# Patient Record
Sex: FEMALE | Race: WHITE
[De-identification: names, ages, dates, MRNs, and addresses within clinical notes are randomized per-mention and may not be internally consistent; named-entity substitution may affect disease eponyms.]

---

## 2018-01-01 ENCOUNTER — HOSPITAL ENCOUNTER (EMERGENCY)
Dept: HOSPITAL 17 - NEPK | Age: 28
Discharge: HOME | End: 2018-01-01
Payer: COMMERCIAL

## 2018-01-01 VITALS
SYSTOLIC BLOOD PRESSURE: 149 MMHG | HEART RATE: 97 BPM | OXYGEN SATURATION: 100 % | TEMPERATURE: 98.7 F | DIASTOLIC BLOOD PRESSURE: 94 MMHG | RESPIRATION RATE: 16 BRPM

## 2018-01-01 VITALS — WEIGHT: 220.46 LBS | BODY MASS INDEX: 34.6 KG/M2 | HEIGHT: 67 IN
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
## 2018-01-01 DIAGNOSIS — S93.401A: Primary | ICD-10-CM

## 2018-01-01 PROCEDURE — 29515 APPLICATION SHORT LEG SPLINT: CPT

## 2018-01-01 PROCEDURE — 99283 EMERGENCY DEPT VISIT LOW MDM: CPT

## 2018-01-01 PROCEDURE — E0113 CRUTCH UNDERARM EACH WOOD: HCPCS

## 2018-01-01 PROCEDURE — L1906 AFO MULTILIG ANK SUP PRE OTS: HCPCS

## 2018-01-01 PROCEDURE — 73610 X-RAY EXAM OF ANKLE: CPT

## 2018-01-01 NOTE — PD
HPI


Chief Complaint:  Injury


Time Seen by Provider:  16:13


Travel History


International Travel<30 days:  No


Contact w/Intl Traveler<30days:  No


Traveled to known affect area:  No





History of Present Illness


HPI


27-year-old  female brought in by EMS status post slip and fall with 

injury to the right lateral ankle.  Patient was coming out of the local book 

store when she slipped and fell [place] injuring her right lateral ankle.  Pain 

is currently 6 out of 10.  Patient denies numbness or tingling distally.  She 

denies foot pain on the right.  She denies knee or shin pain.  There are no 

open wounds or abrasions.  Patient denies any other injury.  She is unable to 

bear weight on the right foot.  She has no known drug allergies.





PFSH


Past Medical History


Pregnant?:  Not Pregnant


LMP:  12/12/17





Social History


Alcohol Use:  Yes


Tobacco Use:  No


Substance Use:  No





Allergies-Medications


(Allergen,Severity, Reaction):  


Coded Allergies:  


     No Known Allergies (Verified  Allergy, Unknown, 7/21/04)


Reported Meds & Prescriptions





Reported Meds & Active Scripts


Active


Mapap Extra Strength (Acetaminophen) 500 Mg Tab 1,000 Mg PO Q4-6H PRN


Ibuprofen 600 Mg Tab 600 Mg PO Q6H PRN








Review of Systems


Except as stated in HPI:  all other systems reviewed are Neg


General / Constitutional:  No: Fever


Eyes:  No: Visual changes


HENT:  No: Headaches


Cardiovascular:  No: Chest Pain or Discomfort


Respiratory:  No: Shortness of Breath


Gastrointestinal:  No: Abdominal Pain


Genitourinary:  No: Dysuria


Musculoskeletal:  Positive: Arthralgias, Limited ROM, Pain


Skin:  No Rash


Neurologic:  No: Weakness


Psychiatric:  No: Depression


Endocrine:  No: Polydipsia


Hematologic/Lymphatic:  No: Easy Bruising





Physical Exam


Narrative


GENERAL: Patient appears in mild to moderate distress.


SKIN: Warm and dry.  Normal color.  Normal turgor.  No ecchymosis.  No open 

wounds or abrasions.


HEAD: Atraumatic. Normocephalic. 


EYES: Pupils equal and round. No scleral icterus. No injection or drainage. 


ENT: No nasal bleeding or discharge.  Mucous membranes pink and moist.  Pharynx 

is clear.  Airway is patent.


NECK: Trachea midline.  Supple and nontender.


CARDIOVASCULAR: Regular rate and rhythm.  


RESPIRATORY: No accessory muscle use. Clear to auscultation. Breath sounds 

equal bilaterally. 


MUSCULOSKELETAL: Extremities without clubbing, cyanosis, or edema. No obvious 

deformities.  Patient has pain swelling over the right lateral malleolus.  No 

palpable crepitus.  There is no pain with palpation of the right foot.  There 

is no pain with palpation of the proximal right fibula.


NEUROLOGICAL: Awake and alert. No obvious cranial nerve deficits.  Motor 

grossly within normal limits. Five out of 5 muscle strength in the arms and 

legs.  Normal speech.


PSYCHIATRIC: Appropriate mood and affect; insight and judgment normal.





Data


Data


Last Documented VS





Vital Signs








  Date Time  Temp Pulse Resp B/P (MAP) Pulse Ox O2 Delivery O2 Flow Rate FiO2


 


1/1/18 16:03 98.7 97 16 149/94 (112) 100   








Orders





 Orders


Ankle, Complete ([id number]) (1/1/18 16:14)


Ice/Cold Pack (1/1/18 16:14)


Splint Or Brace Apply/Monitor (1/1/18 16:46)


Crutches (1/1/18 16:46)








MDM


Medical Decision Making


Medical Screen Exam Complete:  Yes


Emergency Medical Condition:  Yes


Differential Diagnosis


Slip and fall.  Right ankle sprain.  Right ankle fracture.


Narrative Course


Ice pack is applied to the injured area.


X-rays ordered of the right ankle.


Patient did not request pain medication at this time.


X-ray shows no obvious fracture or dislocation.


Patient is placed in an air cast or splint and crutches.


Patient is given ibuprofen 600 mg 4 times a day #40.


Patient is given Mapap 500 mg 2 tabs every 6 hours when necessary.  #80.


Patient is to use ice and bear weight as tolerated over the next several weeks.


Patient should follow up if symptoms are not improving in the next week to 10 

days.





Diagnosis





 Primary Impression:  


 Moderate right ankle sprain


 Qualified Codes:  S93.401A - Sprain of unspecified ligament of right ankle, 

initial encounter


Referrals:  


Primary Care Physician


Patient Instructions:  Ankle Sprain (ED), Ankle Sprain Exercises (GEN), Ankle 

Stirrup Splint (ED), Crutch Instructions (ED), General Instructions


Departure Forms:  Work Release   


   Special Instructions:  Patient is to use ankle splint and crutches until 

symptoms resolve and the


      right ankle, or until cleared by primary care physician.





***Additional Instructions:  


X-ray shows no obvious fracture or dislocation.


Patient is placed in an air cast or splint and crutches.


Patient is given ibuprofen 600 mg 4 times a day #40.


Patient is given Mapap 500 mg 2 tabs every 6 hours when necessary.  #80.


Patient is to use ice and bear weight as tolerated over the next several weeks.


Patient should follow up if symptoms are not improving in the next week to 10 

days.


***Med/Other Pt SpecificInfo:  Prescription(s) given


Scripts


Acetaminophen (Mapap Extra Strength) 500 Mg Tab


1000 MG PO Q4-6H Y for PAIN, #80 TAB 0 Refills


   Prov: Oz Perkins MD         1/1/18 


Ibuprofen (Ibuprofen) 600 Mg Tab


600 MG PO Q6H Y for Pain/Inflammation, #40 TAB 0 Refills


   Prov: Oz Perkins MD         1/1/18


Disposition:  01 DISCHARGE HOME


Condition:  Stable











Catarino Ocasio Jan 1, 2018 16:20

## 2018-01-01 NOTE — RADRPT
EXAM DATE/TIME:  01/01/2018 16:27 

 

[facility] COMPARISON:     

No previous studies available for comparison.

 

                     

INDICATIONS :     Patient rolled ankle laterally. Complains of pain and swelling

                     

MEDICAL HISTORY :     None.          

SURGICAL HISTORY :     None.   

ENCOUNTER:     Initial                                        

ACUITY:     1 day      

PAIN SCORE:     9/10

LOCATION:     Right lateral ankle

 

FINDINGS:     

No definite fractures, or dislocations are identified.  No definite lytic or sclerotic lesion is seen
.  Calcaneal spur is present at the attachment site of the plantar aponeurosis.

 

CONCLUSION:          Unremarkable study except for calcaneal spur.

 

 ESTEBAN Conte MD on January 01, 2018 at 16:50           

Board Certified Radiologist.

 This report was verified electronically.